# Patient Record
Sex: MALE | Race: AMERICAN INDIAN OR ALASKA NATIVE
[De-identification: names, ages, dates, MRNs, and addresses within clinical notes are randomized per-mention and may not be internally consistent; named-entity substitution may affect disease eponyms.]

---

## 2018-06-17 ENCOUNTER — HOSPITAL ENCOUNTER (EMERGENCY)
Dept: HOSPITAL 5 - ED | Age: 33
Discharge: HOME | End: 2018-06-17
Payer: SELF-PAY

## 2018-06-17 VITALS — DIASTOLIC BLOOD PRESSURE: 85 MMHG | SYSTOLIC BLOOD PRESSURE: 130 MMHG

## 2018-06-17 DIAGNOSIS — J45.909: ICD-10-CM

## 2018-06-17 DIAGNOSIS — M79.1: Primary | ICD-10-CM

## 2018-06-17 PROCEDURE — 99282 EMERGENCY DEPT VISIT SF MDM: CPT

## 2018-06-17 NOTE — EMERGENCY DEPARTMENT REPORT
ED General Adult HPI





- General


Chief complaint: Pain General


Stated complaint: BODY PAIN


Time Seen by Provider: 06/17/18 22:38


Source: patient


Mode of arrival: Ambulatory


Limitations: No Limitations





- History of Present Illness


Initial comments: 





33-year-old -American male comes in for complaint of cold sharp pains 

all over his body for about 2 months.  Patient reports that the sharp pain she 

is to his fingertips and toes.  He reports that the pain is intermittent but is 

getting it more.  Denies any trauma no falls.  Patient reports that the pain 

lasts about 20-30 seconds and the shortest 10 seconds.  She is not able to 

correlate any activity when the sharp pains come.  Patient reports that he had 

a headache earlier today intermittent did not take any medication at that time 

and went away.  He reports he has a past medical history of asthma and herpes.  

This asthma attack was about 10 years ago.  Not had any recent herpetic 

outbreaks.  He currently has no primary care denies any change in vision denies 

any nausea no vomiting no fever or chills currently takes no medications on a 

daily basis and has known drug allergies.


-: month(s) (2)


Radiation: non-radiation


Severity scale (0 -10): 3


Quality: sharp, other


Consistency: intermittent


Improves with: none


Worsens with: none (shooting pain)


Associated Symptoms: other (intermittent dizziness)


Treatments Prior to Arrival: none





- Related Data


 Allergies











Allergy/AdvReac Type Severity Reaction Status Date / Time


 


No Known Allergies Allergy   Verified 06/17/18 20:50














ED Review of Systems


ROS: 


Stated complaint: BODY PAIN


Other details as noted in HPI





Constitutional: denies: chills, fever


Eyes: denies: eye pain, eye discharge, vision change


ENT: denies: ear pain, throat pain


Respiratory: denies: cough, shortness of breath, wheezing


Cardiovascular: denies: chest pain, palpitations


Endocrine: no symptoms reported


Gastrointestinal: denies: abdominal pain, nausea, diarrhea


Genitourinary: denies: urgency, dysuria


Musculoskeletal: denies: back pain, joint swelling, arthralgia


Skin: denies: rash, lesions


Neurological: headache (permit none at this time), other (shooting pain to toes 

and fingers generalized).  denies: numbness


Psychiatric: denies: anxiety, depression


Hematological/Lymphatic: denies: easy bleeding, easy bruising





ED Past Medical Hx





- Past Medical History


Previous Medical History?: Yes


Hx Asthma: Yes





- Surgical History


Past Surgical History?: No





- Social History


Smoking Status: Never Smoker


Substance Use Type: None





ED Physical Exam





- General


Limitations: No Limitations


General appearance: alert, in no apparent distress





- Head


Head exam: Present: atraumatic, normocephalic





- Eye


Eye exam: Present: normal appearance





- ENT


ENT exam: Present: mucous membranes moist





- Neck


Neck exam: Present: normal inspection





- Respiratory


Respiratory exam: Present: normal lung sounds bilaterally.  Absent: respiratory 

distress





- Cardiovascular


Cardiovascular Exam: Present: regular rate, normal rhythm.  Absent: systolic 

murmur, diastolic murmur, rubs, gallop





- GI/Abdominal


GI/Abdominal exam: Present: soft, normal bowel sounds





- Rectal


Rectal exam: Present: deferred





- Extremities Exam


Extremities exam: Present: normal inspection





- Back Exam


Back exam: Present: normal inspection





- Neurological Exam


Neurological exam: Present: alert, oriented X3





- Expanded Neurological Exam


  ** Expanded


Speech: Present: fluid speech


Cranial nerves: EOM's Intact: Normal, Gag Reflex: Normal, Tongue Deviation: 

Normal, Nystagmus: Normal, Facial Sensation: Normal, Facial Palsy with Forehead 

Movement: Normal, Facial Palsy without Forehead Movement: Normal


Cerebellar function: Finger to Nose: Normal, Heel to Shin: Normal, Romberg: 

Normal


Upper motor neuron: Peter Neglect: Normal, Pronator Drift: Normal, Sensory 

Extinction: Normal


Sensory exam: Upper Extremity Light Touch: Normal, Upper Extremity Pin Prick: 

Normal, Upper Extremity Temperature: Normal, UE 2 Point Discrimination: Normal, 

Lower Extremity Light Touch: Normal, Lower Extremity Pin Prick: Normal


Motor strength exam: RUE: 4, LUE: 4, RLE: 4


Best Eye Response ([place]): (4) open spontaneously


Best Motor Response ([place]): (6) obeys commands


Best Verbal Response ([place]): (5) oriented


[place]ory Total: 15





- Psychiatric


Psychiatric exam: Present: normal affect, normal mood





- Skin


Skin exam: Present: warm, dry, intact, normal color.  Absent: rash





ED Course





 Vital Signs











  06/17/18





  20:46


 


Temperature 98.7 F


 


Pulse Rate 82


 


Respiratory 18





Rate 


 


Blood Pressure 130/85


 


O2 Sat by Pulse 97





Oximetry 














ED Medical Decision Making





- Medical Decision Making





Patient has been evaluated by this provider fast track.


Patient has normal vital signs


Examination is within normal limits.


Discussed patient that he should follow up with her primary care provider for 

further evaluation. 


I have listed one below in his discharge summary.


Patient verbalizes understanding.





Critical care attestation.: 


If time is entered above; I have spent that time in minutes in the direct care 

of this critically ill patient, excluding procedure time.








ED Disposition


Clinical Impression: 


 Generalized pain





Disposition: DC-01 TO HOME OR SELFCARE


Is pt being admited?: No


Does the pt Need Aspirin: No


Condition: Stable


Additional Instructions: 


Please follow up with your primary care provider I have listed one below for 

further evaluation.  If symptoms persist or gets worse he will return back to 

the emergency room.


Referrals: 


[facility] [Primary Care Provider] - 3-5 Days


[facility] [Provider Group] - 3-5 Days


Forms:  Work/School Release Form(ED)

## 2019-04-05 ENCOUNTER — HOSPITAL ENCOUNTER (EMERGENCY)
Dept: HOSPITAL 5 - ED | Age: 34
LOS: 1 days | Discharge: HOME | End: 2019-04-06
Payer: COMMERCIAL

## 2019-04-05 DIAGNOSIS — H53.8: ICD-10-CM

## 2019-04-05 DIAGNOSIS — R51: Primary | ICD-10-CM

## 2019-04-05 PROCEDURE — 99283 EMERGENCY DEPT VISIT LOW MDM: CPT

## 2019-04-05 PROCEDURE — 96372 THER/PROPH/DIAG INJ SC/IM: CPT

## 2019-04-05 PROCEDURE — 70450 CT HEAD/BRAIN W/O DYE: CPT

## 2019-04-06 VITALS — DIASTOLIC BLOOD PRESSURE: 77 MMHG | SYSTOLIC BLOOD PRESSURE: 121 MMHG

## 2019-04-06 NOTE — EMERGENCY DEPARTMENT REPORT
<ROXANA LARRY - Last Filed: 19 02:02>





ED Headache HPI





- General


Chief Complaint: Headache


Stated Complaint: BLURRED VISION, HEADACHE


Time Seen by Provider: 19 00:07


Source: patient, RN notes reviewed


Exam Limitations: no limitations





- History of Present Illness


Initial Comments: 





This is a 34-year-old -American male who presents with headache and 

blurred vision started today while at work.  Patient reports a history of 

headaches but nothing like this before.  Patient reports pain is behind her 

right eye feels like pounding and pulling from inside.  He also reports some 

blurry fusion.  He denies nausea or vomiting, palpitations, diaphoresis, aura, 

slurred speech, weakness, or paresthesia.


Timing/Duration: 24 hours


Quality: throbbing


Head Injury Location: frontal


Recent Head Trauma: occasional headaches


Modifying Factors: improves with: exposure to light


Associated Symptoms: vision changes.  denies: confusion, fatigue, facial pain, 

fever/chills, flushing, loss of consciousness, nausea/vomiting, nasal 

congestion, nasal drainage, numbness in legs/feet, rash, seizures, sinus 

infection, stiff neck, weakness


Allergies/Adverse Reactions: 


Allergies





No Known Allergies Allergy (Verified 19 21:34)


   








Home Medications: 


Ambulatory Orders





Ibuprofen [Motrin 600 MG tab] 600 mg PO Q8H PRN #21 tablet 19 











ED Review of Systems


Constitutional: denies: chills, fever


Eyes: denies: eye pain, eye discharge, vision change


ENT: denies: ear pain, throat pain


Respiratory: denies: cough, shortness of breath, wheezing


Cardiovascular: denies: chest pain, palpitations


Gastrointestinal: denies: abdominal pain, nausea, diarrhea


Skin: denies: rash, lesions


Neurological: headache.  denies: weakness, paresthesias


Psychiatric: denies: anxiety, depression





ED Past Medical Hx





- Past Medical History


Hx Asthma: Yes





- Social History


Smoking Status: Never Smoker


Substance Use Type: None





- Medications


Home Medications: 


                                Home Medications











 Medication  Instructions  Recorded  Confirmed  Last Taken  Type


 


Ibuprofen [Motrin 600 MG tab] 600 mg PO Q8H PRN #21 tablet 19  Unknown Rx














ED Physical Exam





- General


Limitations: No Limitations


General appearance: alert, in no apparent distress





- Head


Head exam: Present: atraumatic, normocephalic





- ENT


ENT exam: Present: mucous membranes moist, other.  Absent: TM's normal 

bilaterally, normal external ear exam





- Neck


Neck exam: Present: normal inspection





- Respiratory


Respiratory exam: Present: normal lung sounds bilaterally.  Absent: respiratory 

distress





- Cardiovascular


Cardiovascular Exam: Present: regular rate, normal rhythm.  Absent: systolic 

murmur, diastolic murmur, rubs, gallop





- GI/Abdominal


GI/Abdominal exam: Present: soft, normal bowel sounds





- Neurological Exam


Neurological exam: Present: alert, oriented X3, normal gait





- Expanded Neurological Exam


  ** Expanded


Patient oriented to: Present: person, place


Speech: Present: fluid speech


Cranial nerves: EOM's Intact: Normal, Gag Reflex: Normal


Cerebellar function: Finger to Nose: Normal


Motor strength exam: RUE: 5, LLE: 5


Best Eye Response ([place]): (4) open spontaneously


Best Motor Response ([place]): (6) obeys commands


Best Verbal Response ([place]): (5) oriented


[place] Total: 15





- Psychiatric


Psychiatric exam: Present: normal affect, normal mood





- Skin


Skin exam: Present: warm, dry, intact, normal color.  Absent: rash





ED Medical Decision Making





- Medical Decision Making





Patient is stable and was examined by me.  CT of head obtained and pending.  

Patient given Toradol and Zofran while in the ER.  Chart signed to RICARDO Cole





ED Disposition


Clinical Impression: 


Headache


Qualifiers:


 Headache type: unspecified Headache chronicity pattern: acute headache 

Intractability: intractable Qualified Code(s): R51 - Headache





Disposition: DC- TO HOME OR SELFCARE


Condition: Stable


Instructions:  Acute Headache (ED)


Additional Instructions: 


Please take pain medication as needed.  Follow-up with primary care provider if 

headaches persist or gets worse.


Prescriptions: 


Ibuprofen [Motrin 600 MG tab] 600 mg PO Q8H PRN #21 tablet


 PRN Reason: Pain


Referrals: 


[facility] [Primary Care Provider] - 3-5 Days





<LESLIE MORALES - Last Filed: 19 03:10>





ED Review of Systems


ROS: 


Stated complaint: BLURRED VISION, HEADACHE


Other details as noted in HPI








ED Course





                                   Vital Signs











  19





  21:16 01:53 02:23


 


Temperature 98.9 F  


 


Pulse Rate 93 H  


 


Respiratory 18 16 18





Rate   


 


Blood Pressure 140/88  


 


Blood Pressure   





[Right]   


 


O2 Sat by Pulse 98  





Oximetry   














  19





  02:32


 


Temperature 97.8 F


 


Pulse Rate 73


 


Respiratory 16





Rate 


 


Blood Pressure 


 


Blood Pressure 121/77





[Right] 


 


O2 Sat by Pulse 99





Oximetry 














ED Medical Decision Making





- Radiology Data


Radiology results: report reviewed





Patient: ZITA LACY DEMEATRIC MR#: 


[id number] 


: 1985 Acct:[id number] 





Age/Sex: 34 / M ADM Date: 19 





Loc: ED 


Attending Dr: 








Ordering Physician: MALENA AWAD 


Date of Service: 19 


Procedure(s): CT head/brain wo con 


Accession Number(s): [id number] 





cc: MALENA AWAD 








PROCEDURE: CT HEAD/BRAIN WO CON 





TECHNIQUE: Computerized tomography of the head was performed without contrast 

material. 





CT DOSE LENGTH PRODUCT: mGycm 





HISTORY: headache 





COMPARISONS: None . 





FINDINGS: 





Skull and scalp: Normal . 


Paranasal sinuses: Normal . 


Ventricles and subarachnoid spaces: Normal . 


Cerebrum: No evidence of hemorrhage, acute infarction or mass . 


Cerebellum and brainstem: No evidence of hemorrhage, acute infarction or mass . 


Vasculature: Normal . 





IMPRESSION: Normal Examination . 





This document is electronically signed by Harsh Harmon MD., 2019 

02:28:40 AM ET 





Transcribed By: CO 


Dictated By: HRASH HARMON MD 


Electronically Authenticated By: HARSH HARMON MD 


Signed Date/Time: 19 023 











DD/DT: 19 


TD/TT: 19





- Medical Decision Making


Patient is stable CT of head shows normal examination.  Patient be discharged 

home on ibuprofen.


Critical care attestation.: 


If time is entered above; I have spent that time in minutes in the direct care 

of this critically ill patient, excluding procedure time.








ED Disposition


Is pt being admited?: No


Does the pt Need Aspirin: No

## 2019-04-06 NOTE — CAT SCAN REPORT
PROCEDURE:  CT HEAD/BRAIN WO CON 

 

TECHNIQUE:  Computerized tomography of the head was performed without contrast material.  

 

CT DOSE LENGTH PRODUCT: mGycm 

 

HISTORY: headache 

 

COMPARISONS:  None . 

 

FINDINGS: 

 

Skull and scalp:  Normal . 

Paranasal sinuses:  Normal . 

Ventricles and subarachnoid spaces:  Normal . 

Cerebrum:  No evidence of hemorrhage, acute infarction or mass . 

Cerebellum and brainstem:  No evidence of hemorrhage, acute infarction or mass . 

Vasculature:  Normal . 

 

IMPRESSION:  Normal Examination . 

 

This document is electronically signed by Harsh Stewart MD., April 6 2019 02:28:40 AM ET